# Patient Record
Sex: MALE | Race: WHITE | NOT HISPANIC OR LATINO | Employment: FULL TIME | URBAN - METROPOLITAN AREA
[De-identification: names, ages, dates, MRNs, and addresses within clinical notes are randomized per-mention and may not be internally consistent; named-entity substitution may affect disease eponyms.]

---

## 2019-03-17 ENCOUNTER — HOSPITAL ENCOUNTER (EMERGENCY)
Facility: MEDICAL CENTER | Age: 29
End: 2019-03-17
Attending: EMERGENCY MEDICINE

## 2019-03-17 VITALS
WEIGHT: 231.26 LBS | SYSTOLIC BLOOD PRESSURE: 135 MMHG | DIASTOLIC BLOOD PRESSURE: 75 MMHG | TEMPERATURE: 97.3 F | HEART RATE: 99 BPM | RESPIRATION RATE: 16 BRPM | OXYGEN SATURATION: 98 % | HEIGHT: 72 IN | BODY MASS INDEX: 31.32 KG/M2

## 2019-03-17 DIAGNOSIS — K08.89 DENTALGIA: Primary | ICD-10-CM

## 2019-03-17 DIAGNOSIS — K04.7 DENTAL ABSCESS: ICD-10-CM

## 2019-03-17 PROCEDURE — 96372 THER/PROPH/DIAG INJ SC/IM: CPT

## 2019-03-17 PROCEDURE — 700102 HCHG RX REV CODE 250 W/ 637 OVERRIDE(OP): Performed by: EMERGENCY MEDICINE

## 2019-03-17 PROCEDURE — 700111 HCHG RX REV CODE 636 W/ 250 OVERRIDE (IP)

## 2019-03-17 PROCEDURE — 99284 EMERGENCY DEPT VISIT MOD MDM: CPT

## 2019-03-17 PROCEDURE — A9270 NON-COVERED ITEM OR SERVICE: HCPCS | Performed by: EMERGENCY MEDICINE

## 2019-03-17 RX ORDER — AMOXICILLIN AND CLAVULANATE POTASSIUM 875; 125 MG/1; MG/1
1 TABLET, FILM COATED ORAL ONCE
Status: COMPLETED | OUTPATIENT
Start: 2019-03-17 | End: 2019-03-17

## 2019-03-17 RX ORDER — KETOROLAC TROMETHAMINE 30 MG/ML
INJECTION, SOLUTION INTRAMUSCULAR; INTRAVENOUS
Status: COMPLETED
Start: 2019-03-17 | End: 2019-03-17

## 2019-03-17 RX ORDER — HYDROCODONE BITARTRATE AND ACETAMINOPHEN 5; 325 MG/1; MG/1
1-2 TABLET ORAL EVERY 4 HOURS PRN
Qty: 6 TAB | Refills: 0 | Status: SHIPPED | OUTPATIENT
Start: 2019-03-17 | End: 2019-03-20

## 2019-03-17 RX ORDER — AMOXICILLIN AND CLAVULANATE POTASSIUM 875; 125 MG/1; MG/1
1 TABLET, FILM COATED ORAL 2 TIMES DAILY
Qty: 14 TAB | Refills: 0 | Status: SHIPPED | OUTPATIENT
Start: 2019-03-17 | End: 2019-03-24

## 2019-03-17 RX ORDER — KETOROLAC TROMETHAMINE 30 MG/ML
30 INJECTION, SOLUTION INTRAMUSCULAR; INTRAVENOUS ONCE
Status: COMPLETED | OUTPATIENT
Start: 2019-03-17 | End: 2019-03-17

## 2019-03-17 RX ADMIN — KETOROLAC TROMETHAMINE 30 MG: 30 INJECTION, SOLUTION INTRAMUSCULAR; INTRAVENOUS at 10:23

## 2019-03-17 RX ADMIN — AMOXICILLIN AND CLAVULANATE POTASSIUM 1 TABLET: 875; 125 TABLET, FILM COATED ORAL at 10:11

## 2019-03-17 NOTE — ED TRIAGE NOTES
.  Chief Complaint   Patient presents with   • Dental Pain     Right upper dental pain, facial swelling noted     ./70   Pulse (!) 108   Temp 36 °C (96.8 °F) (Temporal)   Resp 16   Ht 1.829 m (6')   Wt 104.9 kg (231 lb 4.2 oz)   SpO2 98%   BMI 31.36 kg/m²     Ambulatory to triage with above complaints, patent airway, able to manage secretions, reports taking leftover abx and Hydrocodone at home, educated on triage process, placed in lobby, told to inform staff of any changes in condition.

## 2019-03-17 NOTE — ED PROVIDER NOTES
ED Provider Note    CHIEF COMPLAINT  Chief Complaint   Patient presents with   • Dental Pain     Right upper dental pain, facial swelling noted       HPI  Luiz Spangler is a 28 y.o. male who presents to the emergency department through triage for dental pain.  Patient describes 2 days of right upper dental pain, now with mild facial swelling as well.  Patient with history of dental caries and previous abscess on the site, however with root canal proximally 8 months ago with improvement until this week.  Pain with mastication.  No fever or chills.  No difficulty swallowing or breathing.  No fever    Patient had amoxicillin left over at home from previous dental infection, he is taking 3 intermittent doses without notable improvement.  Motrin and hydrocodone with temporary improvement.    REVIEW OF SYSTEMS  See HPI for further details. All other systems are negative.     PAST MEDICAL HISTORY   has a past medical history of Head injury and Hypertension.    SOCIAL HISTORY  Social History     Social History Main Topics   • Smoking status: Current Every Day Smoker   • Smokeless tobacco: Not on file   • Alcohol use Yes   • Drug use: No   • Sexual activity: Not on file       SURGICAL HISTORY   has a past surgical history that includes tonsillectomy.    CURRENT MEDICATIONS  Home Medications     Reviewed by Dalila Lewis R.N. (Registered Nurse) on 03/17/19 at 0924  Med List Status: Complete   Medication Last Dose Status   hydrocodone-acetaminophen (VICODIN) 5-500 MG TABS 3/17/2019 Active   hydrocodone-acetaminophen (VICODIN) 5-500 MG TABS  Active                ALLERGIES  Allergies   Allergen Reactions   • Sulfa Drugs Rash     Maliase, and respiratory distresss       PHYSICAL EXAM  VITAL SIGNS: /70   Pulse (!) 108   Temp 36 °C (96.8 °F) (Temporal)   Resp 16   Ht 1.829 m (6')   Wt 104.9 kg (231 lb 4.2 oz)   SpO2 98%   BMI 31.36 kg/m²   Pulse ox interpretation: I interpret this pulse ox as  normal.  Constitutional: Alert in no apparent distress.  HENT: Normocephalic, atraumatic. Bilateral external ears normal, Nose normal. Moist mucous membranes.   Tender to percussion right upper premolar, first and second molars without obvious fracture, necrosis.  No gingival swelling or fluctuance.  No lingual elevation.  Uvula midline.  Tolerating secretions without stridor or dysphonia.  Right face overlying the maxillary region is slightly swollen without cellulitis, induration or fluctuance.  Eyes: Pupils are equal and reactive, Conjunctiva normal.   Neck: Normal range of motion, Supple.  No evidence of meningeal irritation.  Lymphatic: No lymphadenopathy noted.  No cervical or submandibular lymphadenopathy.  Cardiovascular: Normal peripheral perfusion.  Thorax & Lungs: Nonlabored respirations  Skin: Warm, Dry  Musculoskeletal: Good range of motion in all major joints.   Neurologic: Alert and oriented x4.  Ambulating apparently.  Psychiatric: Affect normal, Judgment normal, Mood normal.     COURSE & MEDICAL DECISION MAKING  Nursing notes and vital signs were reviewed. (See chart for details)  The patients records were reviewed, history was obtained from the patient;     Evaluation for dental pain most consistent with infected dental caries, suspect apical dental abscess.  No gingival or other oral abscess requiring I&D at this time.  Tolerating secretions, airway intact.  No meningeal irritation.  No facial cellulitis.  Vital signs are stable with out fever, mild tachycardia is appropriate in the setting.  Never hypotensive.  Tolerating oral fluids and medications.  First dose of Augmentin given in the emergency department.    Patient is stable for discharge at this time, anticipatory guidance provided, Augmentin for 7 days, Motrin for discomfort, #6 Norco for breakthrough pain, close follow-up is encouraged to follow-up with his dentist this week, and strict ED return instructions have been detailed. Patient  is agreeable to the disposition and plan.    Patient's blood pressure was elevated in the emergency department, and has been referred to primary care for close monitoring.     reviewed, no pattern for concern.  In prescribing controlled substances to this patient, I certify that I have obtained and reviewed the medical history of Luiz Spangler. I have also made a good gracie effort to obtain applicable records from other providers who have treated the patient and records did not demonstrate any increased risk of substance abuse that would prevent me from prescribing controlled substances.     I have conducted a physical exam and documented it. I have reviewed Mr. Spangler’s prescription history as maintained by the [place] Prescription Monitoring Program.     I have assessed the patient’s risk for abuse, dependency, and addiction using the validated Opioid Risk Tool available at https://www.mdcalc.com/gujcdm-uvxs-urxe-ort-narcotic-abuse.     Given the above, I believe the benefits of controlled substance therapy outweigh the risks. The reasons for prescribing controlled substances include non-narcotic, oral analgesic alternatives have been inadequate for pain control. Accordingly, I have discussed the risk and benefits, treatment plan, and alternative therapies with the patient.       FINAL IMPRESSION  (K08.89) Dentalgia  (primary encounter diagnosis)  (K04.7) Dental abscess      Electronically signed by: Funmi Batista, 3/17/2019 10:00 AM      This dictation was created using voice recognition software. The accuracy of the dictation is limited to the abilities of the software. I expect there may be some errors of grammar and possibly content. The nursing notes were reviewed and certain aspects of this information were incorporated into this note.

## 2019-03-17 NOTE — ED NOTES
"Pt brought back from [facility], ambulatory with steady gait .    Pt c/o pain to R mouth/face/ear since last week. Pt states he started taking ibuprofen along with left over amoxicillin and percocet but pain and swelling to face has gradually increased.     Pt states he had infected tooth in R side of mouth a while back but states \"I thought they fixed it.\" pt states he has a dentist and was trying to wait until Monday to schedule appt but states \"pain is too bad.\"    Pt a/o x4, speaking in full sentences.   "

## 2019-03-17 NOTE — ED NOTES
MD at bedside prior to d/c. Pt given d/c instruction and verbalized understanding. 2 rx's given. Pt signed consent for controlled substance. Pt ambulatory to lobby, gait steady. Pt took all belongings from room.

## 2019-03-17 NOTE — DISCHARGE INSTRUCTIONS
Follow-up with your dentist this week for reevaluation and definitive treatment.    Augmentin twice daily for 7 days for suspected dental abscess.  Ibuprofen every 6 hours as needed for discomfort.  Norco every 4-6 hours as needed for breakthrough pain.    Soft diet as tolerated.    Return to the emergency department for persistent or worsening pain, facial oral swelling, difficulty swallowing or breathing, headache, neck pain or stiffness, fever or other new concerns.